# Patient Record
Sex: FEMALE | Race: ASIAN
[De-identification: names, ages, dates, MRNs, and addresses within clinical notes are randomized per-mention and may not be internally consistent; named-entity substitution may affect disease eponyms.]

---

## 2019-04-01 ENCOUNTER — HOSPITAL ENCOUNTER (OUTPATIENT)
Dept: HOSPITAL 92 - BICULT | Age: 39
Discharge: HOME | End: 2019-04-01
Attending: FAMILY MEDICINE
Payer: COMMERCIAL

## 2019-04-01 DIAGNOSIS — E04.9: ICD-10-CM

## 2019-04-01 DIAGNOSIS — E03.9: Primary | ICD-10-CM

## 2019-04-01 PROCEDURE — 76536 US EXAM OF HEAD AND NECK: CPT

## 2019-04-01 NOTE — ULT
THYROID ULTRASOUND:

 

DATE: 4/1/2019.

 

COMPARISON: 

None.

 

HISTORY: 

Hyperthyroidism, right-sided thyroid gland enlargement.

 

TECHNIQUE: 

Multiplanar gray scale, sonographic imaging of the thyroid gland obtained.

 

FINDINGS: 

The thyroid parenchyma is uniformly heterogeneous and hypervascular, the thyroid isthmus measuring 4 
mm in AP dimension, the right lobe measuring 5.6 x 2.1 x 2.5 cm, and the left lobe measuring 5.2 x 2.
0 x 2.1 cm.  No discrete thyroid nodule is noted.

 

IMPRESSION: 

Heterogeneous enlarged and hyperemic thyroid gland with no discrete thyroid nodule seen.

 

POS: LITO